# Patient Record
Sex: FEMALE | URBAN - METROPOLITAN AREA
[De-identification: names, ages, dates, MRNs, and addresses within clinical notes are randomized per-mention and may not be internally consistent; named-entity substitution may affect disease eponyms.]

---

## 2021-09-22 ENCOUNTER — OFFICE VISIT (OUTPATIENT)
Dept: URBAN - METROPOLITAN AREA CLINIC 56 | Facility: CLINIC | Age: 81
End: 2021-09-22
Payer: COMMERCIAL

## 2021-09-22 DIAGNOSIS — Z79.84 LONG TERM (CURRENT) USE OF ORAL ANTIDIABETIC DRUGS: ICD-10-CM

## 2021-09-22 DIAGNOSIS — H25.11 AGE-RELATED NUCLEAR CATARACT, RIGHT EYE: ICD-10-CM

## 2021-09-22 DIAGNOSIS — H43.813 VITREOUS DEGENERATION, BILATERAL: ICD-10-CM

## 2021-09-22 DIAGNOSIS — E11.9 TYPE 2 DIABETES MELLITUS W/O COMPLICATION: Primary | ICD-10-CM

## 2021-09-22 PROCEDURE — 92250 FUNDUS PHOTOGRAPHY W/I&R: CPT | Performed by: OPTOMETRIST

## 2021-09-22 PROCEDURE — 99204 OFFICE O/P NEW MOD 45 MIN: CPT | Performed by: OPTOMETRIST

## 2021-09-22 PROCEDURE — 92134 CPTRZ OPH DX IMG PST SGM RTA: CPT | Performed by: OPTOMETRIST

## 2021-09-22 ASSESSMENT — INTRAOCULAR PRESSURE
OD: 14
OS: 14

## 2021-09-22 ASSESSMENT — KERATOMETRY
OD: 47.52
OS: 47.79

## 2021-09-22 ASSESSMENT — VISUAL ACUITY
OD: 20/40
OS: 20/60

## 2021-09-22 NOTE — IMPRESSION/PLAN
Impression: Combined forms of age-related cataract, left eye: H25.812. Plan: Discussed cataract diagnosis with the patient. Discussed and reviewed treatment options for cataracts. Surgical treatment is required for cataracts. Risks and benefits of surgical treatment were discussed and understood. Patient elects surgical treatment.  Recommend surgery OU, OS first.

## 2021-09-22 NOTE — IMPRESSION/PLAN
Impression: Type 2 diabetes mellitus w/o complication: S53.6. Plan: Discussed with the patient my findings. No diabetic retinopathy on today's exam.  Patient encourage to monitor blood glucose and advised to call office if notes any sudden changes in vision. Patient informed the importance for regular diabetic exams.

## 2021-11-05 ENCOUNTER — TESTING ONLY (OUTPATIENT)
Dept: URBAN - METROPOLITAN AREA CLINIC 56 | Facility: CLINIC | Age: 81
End: 2021-11-05
Payer: COMMERCIAL

## 2021-11-05 DIAGNOSIS — Z01.818 ENCOUNTER FOR OTHER PREPROCEDURAL EXAMINATION: Primary | ICD-10-CM

## 2021-11-05 PROCEDURE — 99203 OFFICE O/P NEW LOW 30 MIN: CPT | Performed by: PHYSICIAN ASSISTANT

## 2021-11-11 ENCOUNTER — PRE-OPERATIVE VISIT (OUTPATIENT)
Dept: URBAN - METROPOLITAN AREA CLINIC 56 | Facility: CLINIC | Age: 81
End: 2021-11-11
Payer: COMMERCIAL

## 2021-11-11 DIAGNOSIS — H25.812 COMBINED FORMS OF AGE-RELATED CATARACT, LEFT EYE: Primary | ICD-10-CM

## 2021-11-11 DIAGNOSIS — H52.203 BILATERAL ASTIGMATISM: ICD-10-CM

## 2021-11-11 DIAGNOSIS — H52.13 MYOPIA, BILATERAL: ICD-10-CM

## 2021-11-11 PROCEDURE — 99204 OFFICE O/P NEW MOD 45 MIN: CPT | Performed by: OPHTHALMOLOGY

## 2021-11-11 RX ORDER — OFLOXACIN 3 MG/ML
0.3 % SOLUTION/ DROPS OPHTHALMIC
Qty: 5 | Refills: 1 | Status: ACTIVE
Start: 2021-11-11

## 2021-11-11 RX ORDER — KETOROLAC TROMETHAMINE 5 MG/ML
0.5 % SOLUTION OPHTHALMIC
Qty: 10 | Refills: 1 | Status: ACTIVE
Start: 2021-11-11

## 2021-11-11 RX ORDER — PREDNISOLONE ACETATE 10 MG/ML
1 % SUSPENSION/ DROPS OPHTHALMIC
Qty: 10 | Refills: 1 | Status: ACTIVE
Start: 2021-11-11

## 2021-11-11 ASSESSMENT — INTRAOCULAR PRESSURE
OD: 14
OS: 14

## 2021-11-11 NOTE — IMPRESSION/PLAN
Impression: Age-related nuclear cataract, right eye: H25.11. Plan: May proceed with second eye when first eye is cleared.

## 2021-11-11 NOTE — IMPRESSION/PLAN
Impression: Type 2 diabetes mellitus w/o complication: R14.0. Plan: No diabetic retinopathy, NVI, NVA or NVD observed in today's exam. Discussed with patient importance of good blood sugar control. Continue care with PCP/Endo. Patient understands condition may limit possible outcome of surgery.

## 2021-11-11 NOTE — IMPRESSION/PLAN
Impression: Vitreous degeneration, bilateral: H43.813. Plan: Reviewed signs and symptoms of progression of a Retinal Detachment. Patient instructed to call if symptoms should arise. Patient understands condition may limit possible outcome of surgery.

## 2021-11-11 NOTE — IMPRESSION/PLAN
Impression: Combined forms of age-related cataract, left eye: H25.812. Plan: Discussed cataract diagnosis in detail with patient. Advised patient of treatment options as well as lens options. Discussed risks, benefits, and expectations of cataract surgery. Patient also understands glasses may be necessary for sharpest vision after surgery. Patient understands and accepts that glasses will be necessary following cataract surgery for near and possibly distance. New glasses Rx was not given today. Cataract surgery was offered in both eye(s). Patient wishes to proceed with Cataract surgery. Patient to see [facility] to schedule CE/Phaco/IOL OS 1st eye [[Aim: -2.00 OU. Std lens, No LRI; patient declines AMP/ORA. No Trimoxi/Dexycu, Use Oflox/Pred/Ket QID OU]] Understands higher risk of complication and delayed healing due to dense cataract. Patient understands condition may limit possible outcome of surgery.

## 2021-11-11 NOTE — IMPRESSION/PLAN
Impression: Myopia, bilateral: H52.13. Plan: Discussed loss of myopia with distance AIM and patient wishes to proceed with NEAR aim OU. Reviewed signs and symptoms of retinal detachment were discussed. Patient instructed to call if symptoms should occur. Patient understands condition may limit possible outcome of surgery.

## 2021-11-11 NOTE — IMPRESSION/PLAN
Impression: Bilateral astigmatism: H52.203. Plan: Discussed residual astigmatism will be left and glasses will still be needed for BCVA at ALL ranges.

## 2021-11-18 ENCOUNTER — SURGERY (OUTPATIENT)
Dept: URBAN - METROPOLITAN AREA SURGERY 19 | Facility: SURGERY | Age: 81
End: 2021-11-18
Payer: COMMERCIAL

## 2021-11-18 PROCEDURE — 66984 XCAPSL CTRC RMVL W/O ECP: CPT | Performed by: OPHTHALMOLOGY

## 2021-11-19 ENCOUNTER — POST-OPERATIVE VISIT (OUTPATIENT)
Dept: URBAN - METROPOLITAN AREA CLINIC 44 | Facility: CLINIC | Age: 81
End: 2021-11-19

## 2021-11-19 PROCEDURE — 99024 POSTOP FOLLOW-UP VISIT: CPT | Performed by: OPTOMETRIST

## 2021-11-19 ASSESSMENT — INTRAOCULAR PRESSURE: OS: 19

## 2021-11-19 NOTE — IMPRESSION/PLAN
Impression: S/P Cataract Extraction by phacoemulsification with IOL placement OS - 1 Day. Encounter for surgical aftercare following surgery on a sense organ  Z48.060.  Plan:

## 2021-11-23 ENCOUNTER — POST-OPERATIVE VISIT (OUTPATIENT)
Dept: URBAN - METROPOLITAN AREA CLINIC 56 | Facility: CLINIC | Age: 81
End: 2021-11-23

## 2021-11-23 DIAGNOSIS — Z48.810 ENCOUNTER FOR SURGICAL AFTERCARE FOLLOWING SURGERY ON A SENSE ORGAN: Primary | ICD-10-CM

## 2021-11-23 PROCEDURE — 99024 POSTOP FOLLOW-UP VISIT: CPT | Performed by: OPTOMETRIST

## 2021-11-23 ASSESSMENT — VISUAL ACUITY
OS: 20/50
OD: 20/40

## 2021-11-23 ASSESSMENT — INTRAOCULAR PRESSURE
OD: 14
OS: 13

## 2021-11-23 NOTE — IMPRESSION/PLAN
Impression: S/P Cataract Extraction by phacoemulsification with IOL placement OS - 5 Days. Encounter for surgical aftercare following surgery on a sense organ  Z48.810. Plan: S/P CAT SX OS Aim (-2.00) Monofocal 
Ok to proceed with second eye. Second eye aim: near/distance RTC as scheduled for second eye CAT SX.

## 2021-12-02 ENCOUNTER — SURGERY (OUTPATIENT)
Dept: URBAN - METROPOLITAN AREA SURGERY 19 | Facility: SURGERY | Age: 81
End: 2021-12-02
Payer: COMMERCIAL

## 2021-12-02 PROCEDURE — 66984 XCAPSL CTRC RMVL W/O ECP: CPT | Performed by: OPHTHALMOLOGY

## 2021-12-03 ENCOUNTER — POST-OPERATIVE VISIT (OUTPATIENT)
Dept: URBAN - METROPOLITAN AREA CLINIC 56 | Facility: CLINIC | Age: 81
End: 2021-12-03

## 2021-12-03 PROCEDURE — 99024 POSTOP FOLLOW-UP VISIT: CPT | Performed by: STUDENT IN AN ORGANIZED HEALTH CARE EDUCATION/TRAINING PROGRAM

## 2021-12-03 ASSESSMENT — INTRAOCULAR PRESSURE: OS: 18

## 2021-12-03 NOTE — IMPRESSION/PLAN
Impression: S/P CE/Standard IOL OD - . Presence of intraocular lens  Z96.1. Excellent post op course   Post operative instructions reviewed - Plan: good IOP, no infection. F/u as scheduled. Call with pain or decreased VA.

## 2021-12-23 ENCOUNTER — POST-OPERATIVE VISIT (OUTPATIENT)
Dept: URBAN - METROPOLITAN AREA CLINIC 56 | Facility: CLINIC | Age: 81
End: 2021-12-23

## 2021-12-23 DIAGNOSIS — Z96.1 PRESENCE OF INTRAOCULAR LENS: Primary | ICD-10-CM

## 2021-12-23 PROCEDURE — 99024 POSTOP FOLLOW-UP VISIT: CPT | Performed by: STUDENT IN AN ORGANIZED HEALTH CARE EDUCATION/TRAINING PROGRAM

## 2021-12-23 ASSESSMENT — VISUAL ACUITY
OS: 20/50
OD: 20/25

## 2021-12-23 ASSESSMENT — INTRAOCULAR PRESSURE
OS: 13
OD: 14

## 2021-12-23 NOTE — IMPRESSION/PLAN
Impression: S/P Cataract Extraction by phacoemulsification with IOL placement OD - 21 Days. Presence of intraocular lens  Z96.1. Excellent post op course   Post operative instructions reviewed - Plan: updated spec Rx. Asymmetric AL OS >> OD, patient states longstanding decreased vision. Appears to be refractive amblyopia OS. Discussed limited BVA with patient. Mac OCT WNL.

## 2022-09-07 ENCOUNTER — OFFICE VISIT (OUTPATIENT)
Dept: URBAN - METROPOLITAN AREA CLINIC 56 | Facility: CLINIC | Age: 82
End: 2022-09-07
Payer: COMMERCIAL

## 2022-09-07 DIAGNOSIS — Z79.84 LONG TERM (CURRENT) USE OF ORAL ANTIDIABETIC DRUGS: ICD-10-CM

## 2022-09-07 DIAGNOSIS — H52.4 PRESBYOPIA: ICD-10-CM

## 2022-09-07 DIAGNOSIS — H43.813 VITREOUS DEGENERATION, BILATERAL: ICD-10-CM

## 2022-09-07 DIAGNOSIS — H26.493 OTHER SECONDARY CATARACT, BILATERAL: ICD-10-CM

## 2022-09-07 DIAGNOSIS — E11.9 TYPE 2 DIABETES MELLITUS WITHOUT COMPLICATIONS: Primary | ICD-10-CM

## 2022-09-07 PROCEDURE — 92134 CPTRZ OPH DX IMG PST SGM RTA: CPT | Performed by: STUDENT IN AN ORGANIZED HEALTH CARE EDUCATION/TRAINING PROGRAM

## 2022-09-07 PROCEDURE — 92014 COMPRE OPH EXAM EST PT 1/>: CPT | Performed by: STUDENT IN AN ORGANIZED HEALTH CARE EDUCATION/TRAINING PROGRAM

## 2022-09-07 ASSESSMENT — INTRAOCULAR PRESSURE
OD: 14
OS: 14

## 2022-09-07 ASSESSMENT — KERATOMETRY
OD: 47.64
OS: 48.40

## 2022-09-07 ASSESSMENT — VISUAL ACUITY
OD: 20/20
OS: 20/40

## 2022-09-07 NOTE — IMPRESSION/PLAN
Impression: Type 2 diabetes mellitus without complications: Y32.9. Plan: continue strict BG control. F/u with PCP as directed. Call with vision changes.